# Patient Record
Sex: FEMALE | Race: WHITE | NOT HISPANIC OR LATINO | Employment: FULL TIME | URBAN - METROPOLITAN AREA
[De-identification: names, ages, dates, MRNs, and addresses within clinical notes are randomized per-mention and may not be internally consistent; named-entity substitution may affect disease eponyms.]

---

## 2022-07-01 ENCOUNTER — OFFICE VISIT (OUTPATIENT)
Dept: GASTROENTEROLOGY | Facility: CLINIC | Age: 67
End: 2022-07-01
Payer: COMMERCIAL

## 2022-07-01 VITALS
SYSTOLIC BLOOD PRESSURE: 118 MMHG | WEIGHT: 142 LBS | HEIGHT: 60 IN | BODY MASS INDEX: 27.88 KG/M2 | OXYGEN SATURATION: 98 % | DIASTOLIC BLOOD PRESSURE: 76 MMHG | HEART RATE: 78 BPM

## 2022-07-01 DIAGNOSIS — K21.9 GASTROESOPHAGEAL REFLUX DISEASE WITHOUT ESOPHAGITIS: ICD-10-CM

## 2022-07-01 DIAGNOSIS — K64.9 HEMORRHOIDS, UNSPECIFIED HEMORRHOID TYPE: ICD-10-CM

## 2022-07-01 DIAGNOSIS — K58.9 IRRITABLE BOWEL SYNDROME, UNSPECIFIED TYPE: Primary | ICD-10-CM

## 2022-07-01 PROCEDURE — 99203 OFFICE O/P NEW LOW 30 MIN: CPT | Performed by: PHYSICIAN ASSISTANT

## 2022-07-01 RX ORDER — FLUTICASONE PROPIONATE AND SALMETEROL 50; 250 UG/1; UG/1
POWDER RESPIRATORY (INHALATION)
COMMUNITY
Start: 2022-06-20

## 2022-07-01 RX ORDER — VALACYCLOVIR HYDROCHLORIDE 1 G/1
1000 TABLET, FILM COATED ORAL DAILY
COMMUNITY
Start: 2022-06-13

## 2022-07-01 RX ORDER — ALPRAZOLAM 0.5 MG/1
TABLET ORAL
COMMUNITY
Start: 2022-05-03

## 2022-07-01 RX ORDER — ALBUTEROL SULFATE 90 UG/1
2 AEROSOL, METERED RESPIRATORY (INHALATION) EVERY 6 HOURS PRN
COMMUNITY

## 2022-07-01 RX ORDER — METHYLPHENIDATE HYDROCHLORIDE 36 MG/1
36 TABLET ORAL DAILY
COMMUNITY
Start: 2022-05-03

## 2022-07-01 NOTE — PROGRESS NOTES
[facility] - Outpatient Consultation  Damian Mortimer 74 y o  female MRN: [id number]  Encounter: [id number]          ASSESSMENT AND PLAN:      1  Irritable bowel syndrome, unspecified type  2  Gastroesophageal reflux disease without esophagitis  3  Hemorrhoids, unspecified hemorrhoid type  -Will plan EGD and colonoscopy at this time   -Will continue over-the-counter supplementation   -Will continue probiotic therapy   ______________________________________________________________________    HPI:    73-year-old female who presents for GI consultation  Patient reports she is here to schedule EGD and colonoscopy  She reports her last colonoscopy was about 3 years ago and she did have colon polyps removed at that time  Patient does suffer from GERD, IBS, and hemorrhoids  She reports she does take a multitude of supplements and natural products to treat her GERD and IBS  She has been reporting worsening upper abdominal bloating and discomfort that started after an episode of vertigo  She does report that she takes Metamucil on a daily basis  She denies any alarm symptoms  She has not had a recent upper endoscopy  She does not take any over-the-counter antacids  She does not take any PPI therapy  REVIEW OF SYSTEMS:    CONSTITUTIONAL: Denies any fever, chills, rigors, and weight loss  HEENT: No earache or tinnitus  Denies hearing loss or visual disturbances  CARDIOVASCULAR: No chest pain or palpitations  RESPIRATORY: Denies any cough, hemoptysis, shortness of breath or dyspnea on exertion  GASTROINTESTINAL: As noted in the History of Present Illness  GENITOURINARY: No problems with urination  Denies any hematuria or dysuria  NEUROLOGIC: No dizziness or vertigo, denies headaches  MUSCULOSKELETAL: Denies any muscle or joint pain  SKIN: Denies skin rashes or itching  ENDOCRINE: Denies excessive thirst  Denies intolerance to heat or cold    PSYCHOSOCIAL: Denies depression or anxiety  Denies any recent memory loss  Historical Information   History reviewed  No pertinent past medical history  Past Surgical History:   Procedure Laterality Date    HYSTERECTOMY       Social History   Social History     Substance and Sexual Activity   Alcohol Use Yes    Comment: occ     Social History     Substance and Sexual Activity   Drug Use Never     Social History     Tobacco Use   Smoking Status Never Smoker   Smokeless Tobacco Never Used     Family History   Problem Relation Age of Onset    Addiction problem Mother     Heart disease Father        Meds/Allergies       Current Outpatient Medications:     Advair Diskus 250-50 MCG/ACT inhaler    albuterol (PROVENTIL HFA,VENTOLIN HFA) 90 mcg/act inhaler    ALPRAZolam (XANAX) 0 5 mg tablet    methylphenidate (CONCERTA) 36 MG ER tablet    valACYclovir (VALTREX) 1,000 mg tablet    No Known Allergies        Objective     Blood pressure 118/76, pulse 78, height 4' 11 5" (1 511 m), weight 64 4 kg (142 lb), SpO2 98 %  Body mass index is 28 2 kg/m²  PHYSICAL EXAM:      General Appearance:   Alert, cooperative, no distress   HEENT:   Normocephalic, atraumatic, anicteric      Neck:  Supple, symmetrical, trachea midline   Lungs:   Clear to auscultation bilaterally; no rales, rhonchi or wheezing; respirations unlabored    Heart[de-identified]   Regular rate and rhythm; no murmur, rub, or gallop  Abdomen:   Soft, non-tender, non-distended; normal bowel sounds; no masses, no organomegaly    Genitalia:   Deferred    Rectal:   Deferred    Extremities:  No cyanosis, clubbing or edema    Pulses:  2+ and symmetric    Skin:  No jaundice, rashes, or lesions    Lymph nodes:  No palpable cervical lymphadenopathy        Lab Results:   No visits with results within 1 Day(s) from this visit  Latest known visit with results is:   No results found for any previous visit  Radiology Results:   No results found

## 2022-07-01 NOTE — LETTER
July 1, 2022     MD [place] [address]    Patient: Jackson Rascon   YOB: 1955   Date of Visit: 7/1/2022       Dear Dr Checo Barraza: Thank you for referring [facility] to me for evaluation  Below are my notes for this consultation  If you have questions, please do not hesitate to call me  I look forward to following your patient along with you  Sincerely,        Katerin Polo PA-C        CC: No Recipients  Katerin Polo PA-C  7/1/2022 12:00 PM  Sign when Signing Visit  [facility] - Outpatient Consultation  Jackson Bowers 74 y o  female MRN: [id number]  Encounter: [id number]          ASSESSMENT AND PLAN:      1  Irritable bowel syndrome, unspecified type  2  Gastroesophageal reflux disease without esophagitis  3  Hemorrhoids, unspecified hemorrhoid type  -Will plan EGD and colonoscopy at this time   -Will continue over-the-counter supplementation   -Will continue probiotic therapy   ______________________________________________________________________    HPI:    73-year-old female who presents for GI consultation  Patient reports she is here to schedule EGD and colonoscopy  She reports her last colonoscopy was about 3 years ago and she did have colon polyps removed at that time  Patient does suffer from GERD, IBS, and hemorrhoids  She reports she does take a multitude of supplements and natural products to treat her GERD and IBS  She has been reporting worsening upper abdominal bloating and discomfort that started after an episode of vertigo  She does report that she takes Metamucil on a daily basis  She denies any alarm symptoms  She has not had a recent upper endoscopy  She does not take any over-the-counter antacids  She does not take any PPI therapy  REVIEW OF SYSTEMS:    CONSTITUTIONAL: Denies any fever, chills, rigors, and weight loss  HEENT: No earache or tinnitus   Denies hearing loss or visual disturbances  CARDIOVASCULAR: No chest pain or palpitations  RESPIRATORY: Denies any cough, hemoptysis, shortness of breath or dyspnea on exertion  GASTROINTESTINAL: As noted in the History of Present Illness  GENITOURINARY: No problems with urination  Denies any hematuria or dysuria  NEUROLOGIC: No dizziness or vertigo, denies headaches  MUSCULOSKELETAL: Denies any muscle or joint pain  SKIN: Denies skin rashes or itching  ENDOCRINE: Denies excessive thirst  Denies intolerance to heat or cold  PSYCHOSOCIAL: Denies depression or anxiety  Denies any recent memory loss  Historical Information   History reviewed  No pertinent past medical history  Past Surgical History:   Procedure Laterality Date    HYSTERECTOMY       Social History   Social History     Substance and Sexual Activity   Alcohol Use Yes    Comment: occ     Social History     Substance and Sexual Activity   Drug Use Never     Social History     Tobacco Use   Smoking Status Never Smoker   Smokeless Tobacco Never Used     Family History   Problem Relation Age of Onset    Addiction problem Mother     Heart disease Father        Meds/Allergies       Current Outpatient Medications:     Advair Diskus 250-50 MCG/ACT inhaler    albuterol (PROVENTIL HFA,VENTOLIN HFA) 90 mcg/act inhaler    ALPRAZolam (XANAX) 0 5 mg tablet    methylphenidate (CONCERTA) 36 MG ER tablet    valACYclovir (VALTREX) 1,000 mg tablet    No Known Allergies        Objective     Blood pressure 118/76, pulse 78, height 4' 11 5" (1 511 m), weight 64 4 kg (142 lb), SpO2 98 %  Body mass index is 28 2 kg/m²  PHYSICAL EXAM:      General Appearance:   Alert, cooperative, no distress   HEENT:   Normocephalic, atraumatic, anicteric      Neck:  Supple, symmetrical, trachea midline   Lungs:   Clear to auscultation bilaterally; no rales, rhonchi or wheezing; respirations unlabored    Heart[de-identified]   Regular rate and rhythm; no murmur, rub, or gallop     Abdomen:   Soft, non-tender, non-distended; normal bowel sounds; no masses, no organomegaly    Genitalia:   Deferred    Rectal:   Deferred    Extremities:  No cyanosis, clubbing or edema    Pulses:  2+ and symmetric    Skin:  No jaundice, rashes, or lesions    Lymph nodes:  No palpable cervical lymphadenopathy        Lab Results:   No visits with results within 1 Day(s) from this visit  Latest known visit with results is:   No results found for any previous visit  Radiology Results:   No results found

## 2022-07-01 NOTE — PATIENT INSTRUCTIONS
Scheduled date of EGD/colonoscopy (as of today):8/27/22  Physician performing EGD/colonoscopy:Meredith  Location of EGD/colonoscopy:[facility] bowel prep reviewed with patient:miralax/dulcolax  Instructions reviewed with patient by:Eboni HALL  Clearances:   none

## 2022-08-27 ENCOUNTER — ANESTHESIA (OUTPATIENT)
Dept: GASTROENTEROLOGY | Facility: HOSPITAL | Age: 67
End: 2022-08-27

## 2022-08-27 ENCOUNTER — ANESTHESIA EVENT (OUTPATIENT)
Dept: GASTROENTEROLOGY | Facility: HOSPITAL | Age: 67
End: 2022-08-27

## 2022-08-27 ENCOUNTER — HOSPITAL ENCOUNTER (OUTPATIENT)
Dept: GASTROENTEROLOGY | Facility: HOSPITAL | Age: 67
Setting detail: OUTPATIENT SURGERY
Discharge: HOME | End: 2022-08-27
Payer: COMMERCIAL

## 2022-08-27 VITALS
HEIGHT: 60 IN | TEMPERATURE: 99.1 F | RESPIRATION RATE: 19 BRPM | HEART RATE: 92 BPM | WEIGHT: 138.67 LBS | SYSTOLIC BLOOD PRESSURE: 130 MMHG | OXYGEN SATURATION: 98 % | BODY MASS INDEX: 27.22 KG/M2 | DIASTOLIC BLOOD PRESSURE: 68 MMHG

## 2022-08-27 DIAGNOSIS — K21.9 GASTROESOPHAGEAL REFLUX DISEASE WITHOUT ESOPHAGITIS: ICD-10-CM

## 2022-08-27 DIAGNOSIS — K58.9 IRRITABLE BOWEL SYNDROME, UNSPECIFIED TYPE: ICD-10-CM

## 2022-08-27 DIAGNOSIS — K64.9 HEMORRHOIDS, UNSPECIFIED HEMORRHOID TYPE: ICD-10-CM

## 2022-08-27 PROCEDURE — 88305 TISSUE EXAM BY PATHOLOGIST: CPT | Performed by: PATHOLOGY

## 2022-08-27 RX ORDER — PROPOFOL 10 MG/ML
INJECTION, EMULSION INTRAVENOUS AS NEEDED
Status: DISCONTINUED | OUTPATIENT
Start: 2022-08-27 | End: 2022-08-27

## 2022-08-27 RX ORDER — SODIUM CHLORIDE, SODIUM LACTATE, POTASSIUM CHLORIDE, CALCIUM CHLORIDE 600; 310; 30; 20 MG/100ML; MG/100ML; MG/100ML; MG/100ML
INJECTION, SOLUTION INTRAVENOUS CONTINUOUS PRN
Status: DISCONTINUED | OUTPATIENT
Start: 2022-08-27 | End: 2022-08-27

## 2022-08-27 RX ORDER — LIDOCAINE HYDROCHLORIDE 20 MG/ML
INJECTION, SOLUTION EPIDURAL; INFILTRATION; INTRACAUDAL; PERINEURAL AS NEEDED
Status: DISCONTINUED | OUTPATIENT
Start: 2022-08-27 | End: 2022-08-27

## 2022-08-27 RX ADMIN — PROPOFOL 50 MG: 10 INJECTION, EMULSION INTRAVENOUS at 10:49

## 2022-08-27 RX ADMIN — PROPOFOL 50 MG: 10 INJECTION, EMULSION INTRAVENOUS at 10:56

## 2022-08-27 RX ADMIN — PROPOFOL 50 MG: 10 INJECTION, EMULSION INTRAVENOUS at 10:51

## 2022-08-27 RX ADMIN — LIDOCAINE HYDROCHLORIDE 100 MG: 20 INJECTION, SOLUTION EPIDURAL; INFILTRATION; INTRACAUDAL; PERINEURAL at 10:48

## 2022-08-27 RX ADMIN — PROPOFOL 100 MG: 10 INJECTION, EMULSION INTRAVENOUS at 10:48

## 2022-08-27 RX ADMIN — PROPOFOL 50 MG: 10 INJECTION, EMULSION INTRAVENOUS at 11:00

## 2022-08-27 RX ADMIN — PROPOFOL 50 MG: 10 INJECTION, EMULSION INTRAVENOUS at 10:53

## 2022-08-27 RX ADMIN — SODIUM CHLORIDE, SODIUM LACTATE, POTASSIUM CHLORIDE, AND CALCIUM CHLORIDE: .6; .31; .03; .02 INJECTION, SOLUTION INTRAVENOUS at 10:24

## 2022-08-27 NOTE — ANESTHESIA POSTPROCEDURE EVALUATION
Post-Op Assessment Note    CV Status:  Stable  Pain Score: 0    Pain management: adequate     Mental Status:  Sleepy   Hydration Status:  Euvolemic   PONV Controlled:  Controlled   Airway Patency:  Patent      Post Op Vitals Reviewed: Yes      Staff: Anesthesiologist, CRNA         No complications documented      /82 (08/27/22 1103)    Temp      Pulse 96 (08/27/22 1103)   Resp 14 (08/27/22 1103)    SpO2 100 % (08/27/22 1103)

## 2022-08-27 NOTE — ANESTHESIA PREPROCEDURE EVALUATION
Procedure:  COLONOSCOPY    Relevant Problems   No relevant active problems   axniety, insomnia- chronic alprazlopam use (using less than last year 1 5 mg per week  0 5 qhs PRN)     Physical Exam    Airway    Mallampati score: II  TM Distance: >3 FB  Neck ROM: full     Dental       Cardiovascular  Cardiovascular exam normal    Pulmonary  Pulmonary exam normal     Other Findings        Anesthesia Plan  ASA Score- 2     Anesthesia Type- IV sedation with anesthesia with ASA Monitors  Additional Monitors:   Airway Plan:           Plan Factors-Exercise tolerance (METS): >4 METS  Chart reviewed  EKG reviewed  Imaging results reviewed  Existing labs reviewed  Patient summary reviewed  Patient is not a current smoker  Induction- intravenous  Postoperative Plan-     Informed Consent- Anesthetic plan and risks discussed with patient  I personally reviewed this patient with the CRNA  Discussed and agreed on the Anesthesia Plan with the CRNA  Esmer Horn

## 2022-08-27 NOTE — H&P
History and Physical -  [facility]  Josue Hardy 74 y o  female MRN: [id number]      HPI: Josue Hardy is a 74y o  year old female who presents for evaluation of irritable bowel syndrome, predominantly characterized by diarrhea and abdominal pain      REVIEW OF SYSTEMS: Per the HPI, and otherwise unremarkable  Historical Information   Past Medical History:   Diagnosis Date    Asthma     Colon polyp      Past Surgical History:   Procedure Laterality Date    HYSTERECTOMY       Social History   Social History     Substance and Sexual Activity   Alcohol Use Yes    Comment: occ     Social History     Substance and Sexual Activity   Drug Use Never     Social History     Tobacco Use   Smoking Status Never Smoker   Smokeless Tobacco Never Used     Family History   Problem Relation Age of Onset    Addiction problem Mother     Heart disease Father        Meds/Allergies     (Not in a hospital admission)      No Known Allergies    Objective     Blood pressure 130/71, pulse 98, temperature 98 °F (36 7 °C), temperature source Temporal, resp  rate 16, height 5' (1 524 m), weight 62 9 kg (138 lb 10 7 oz), SpO2 96 %  PHYSICAL EXAM    Gen: NAD  CV: RRR  CHEST: Clear  ABD: soft, NT/ND  EXT: no edema      ASSESSMENT/PLAN:  This is a 74y o  year old female here for colonoscopy with biopsy, and she is stable and optimized for her procedure

## 2022-08-29 ENCOUNTER — NURSE TRIAGE (OUTPATIENT)
Dept: OTHER | Facility: OTHER | Age: 67
End: 2022-08-29

## 2022-08-29 NOTE — TELEPHONE ENCOUNTER
Patient is having abdominal pain and usually takes metamucil but wants to confirm with the provider first  Reported she recently had a biopsy  Notes she had a fever yesterday, but denies fever today  Requesting a call back

## 2022-08-29 NOTE — TELEPHONE ENCOUNTER
Regarding: Post procedure symptoms  ----- Message from Reina Medina sent at 8/29/2022  3:22 PM EDT -----  " I am a little concern  My abdomen is very sore today  I did have a fever yesterday of 101 5 taken around 2pm  I did take tylenol for fever    No fever today but abdomen still very sore "

## 2022-08-29 NOTE — TELEPHONE ENCOUNTER
Reason for Disposition   MODERATE pain (e g , interferes with normal activities that comes and goes (cramps) lasts > 24 hours (Exception: pain with Vomiting or Diarrhea - see that Protocol)    Answer Assessment - Initial Assessment Questions  1  LOCATION: "Where does it hurt?"   Lower abdomen  2  RADIATION: "Does the pain shoot anywhere else?" (e g , chest, back)  Denies  3  ONSET: "When did the pain begin?" (e g , minutes, hours or days ago)   Saturday   4  SUDDEN: "Gradual or sudden onset?"  Gradual  5  PATTERN "Does the pain come and go, or is it constant?"     - If constant: "Is it getting better, staying the same, or worsening?"       (Note: Constant means the pain never goes away completely; most serious pain is constant and it progresses)      - If intermittent: "How long does it last?" "Do you have pain now?"      (Note: Intermittent means the pain goes away completely between bouts)  Constant   6  SEVERITY: "How bad is the pain?"  (e g , Scale 1-10; mild, moderate, or severe)    - MILD (1-3): doesn't interfere with normal activities, abdomen soft and not tender to touch     - MODERATE (4-7): interferes with normal activities or awakens from sleep, tender to touch     - SEVERE (8-10): excruciating pain, doubled over, unable to do any normal activities   Sore-  4/10  7  RECURRENT SYMPTOM: "Have you ever had this type of stomach pain before?" If Yes, ask: "When was the last time?" and "What happened that time?"   Denies  8  CAUSE: "What do you think is causing the stomach pain?"   I recently had a biopsy   9  RELIEVING/AGGRAVATING FACTORS: "What makes it better or worse?" (e g , movement, antacids, bowel movement)  Worse with bowel movement, or coughing   10  OTHER SYMPTOMS: "Has there been any vomiting, diarrhea, constipation, or urine problems?"   Denies    Fever 101 5F yesterday  Denies fever today      Protocols used: ABDOMINAL PAIN - FEMALE-ADULT-[place], ABDOMINAL PAIN - [facility]

## 2022-09-01 ENCOUNTER — TELEPHONE (OUTPATIENT)
Dept: GASTROENTEROLOGY | Facility: CLINIC | Age: 67
End: 2022-09-01

## 2022-09-01 PROCEDURE — 88305 TISSUE EXAM BY PATHOLOGIST: CPT | Performed by: PATHOLOGY

## 2022-09-01 NOTE — TELEPHONE ENCOUNTER
----- Message from Maite Laughlin DO sent at 9/1/2022  2:25 PM EDT -----  please call the patient with the biopsy results  Biopsies of the random colon were benign, negative for microscopic colitis and negative for cancer

## 2022-09-16 ENCOUNTER — TELEPHONE (OUTPATIENT)
Dept: GASTROENTEROLOGY | Facility: CLINIC | Age: 67
End: 2022-09-16

## 2022-09-16 NOTE — TELEPHONE ENCOUNTER
As per patients request the upper endoscopy scheduled on 8/27/22 was cancelled and not performed  Only the colonoscopy was performed

## 2022-09-23 ENCOUNTER — DOCUMENTATION (OUTPATIENT)
Dept: GASTROENTEROLOGY | Facility: CLINIC | Age: 67
End: 2022-09-23

## 2022-09-23 NOTE — PROGRESS NOTES
The EGD that was scheduled for 8/27/22 was not performed  The patient requested on 8/27/22 that this EGD not be performed  Only the colonoscopy was performed

## 2022-11-03 ENCOUNTER — TELEPHONE (OUTPATIENT)
Dept: GASTROENTEROLOGY | Facility: CLINIC | Age: 67
End: 2022-11-03

## 2022-11-03 NOTE — TELEPHONE ENCOUNTER
Patients GI provider:  Dr Josse To    Number to return call: [phone]     Reason for call: Pt called and stated she received a $600 bill from [employer] and per patients insurance Colonoscopy was coded incorrectly  She contacted [employer] but they stated we have to review and code it  correctly   please review and reach out to patient to discuss  thank you      Scheduled procedure/appointment date if applicable: n/a

## 2022-11-04 NOTE — TELEPHONE ENCOUNTER
Spoke with the patient and advised that given her history of colon polyps based upon the information she provided to Arthur Arias during the visit, this was not just a screening colonoscopy  I could not adjust the code based upon the discussion with Arthur Arias as that would be insurance fraud  She expressed her frustration very loudly and used profanity during our conversation  She stated she will never come back to [facility] and that she has never paid for a colonoscopy before in her life  She was transferred to the billing department to set up a payment arrangement  Spoke with Favio Adams in billing and did informed her of the situation prior to making the transfer

## 2025-03-12 ENCOUNTER — OFFICE VISIT (OUTPATIENT)
Dept: CARDIOLOGY CLINIC | Facility: CLINIC | Age: 70
End: 2025-03-12
Payer: COMMERCIAL

## 2025-03-12 VITALS
WEIGHT: 130.6 LBS | HEART RATE: 86 BPM | BODY MASS INDEX: 25.64 KG/M2 | HEIGHT: 60 IN | DIASTOLIC BLOOD PRESSURE: 70 MMHG | SYSTOLIC BLOOD PRESSURE: 110 MMHG

## 2025-03-12 DIAGNOSIS — E78.5 DYSLIPIDEMIA: ICD-10-CM

## 2025-03-12 DIAGNOSIS — R07.2 PRECORDIAL PAIN: ICD-10-CM

## 2025-03-12 DIAGNOSIS — Z82.49 FAMILY HISTORY OF HEART ATTACK: Primary | ICD-10-CM

## 2025-03-12 PROCEDURE — 99204 OFFICE O/P NEW MOD 45 MIN: CPT | Performed by: INTERNAL MEDICINE

## 2025-03-12 PROCEDURE — 93000 ELECTROCARDIOGRAM COMPLETE: CPT | Performed by: INTERNAL MEDICINE

## 2025-03-12 RX ORDER — BUDESONIDE 0.5 MG/2ML
0.5 INHALANT ORAL 2 TIMES DAILY
COMMUNITY
Start: 2025-03-04 | End: 2026-03-04

## 2025-03-12 RX ORDER — SODIUM CHLORIDE FOR INHALATION 0.9 %
VIAL, NEBULIZER (ML) INHALATION
COMMUNITY
Start: 2025-03-06

## 2025-03-12 RX ORDER — PIRFENIDONE 267 MG/1
1 TABLET, FILM COATED ORAL 3 TIMES DAILY
COMMUNITY
Start: 2025-01-14

## 2025-03-12 NOTE — PROGRESS NOTES
Patient ID: Katiuska Salinas is a 70 y.o. female.        Plan:      Assessment & Plan  Precordial pain  Not clearly angina but given strong family history of early CAD, need to r/o overt disease.  Stress echo will be ordered.  As well CT coronary calcium score will be obtained.  Dyslipidemia  We talked at length about whether to take statin tx and agreed to hold off until after testing.  Family history of heart attack  Testing as above      Follow up Plan/Other summary comments:  We will regroup  after the above.  She eats lots of rich.  It may be worth retesting labs off rich and butter if she resists statin tx.    HPI: Patient is seen today to establish care.  She is concerned for the reasons above.  There is occasional precordial discomfort that radiated to the neck which he blames on esophageal spasm.  This does not occur with effort.  There is an issue with dyspnea which dates back to aspiration postpartum many years ago and a long hospital stay.  She has what sounds like some residual pulmonary fibrosis from this.  Multiple family members have had heart disease at a young age.  No recent change in exertional capacity.  In fact she would like to exert herself more in the future.      Results for orders placed or performed in visit on 03/12/25   POCT ECG    Impression    Sinus rhythm.  Nonspecific ST segment changes.         Most recent or relevant cardiac/vascular testing:    High-resolution chest CT 12/6/2024: Mild coronary calcification.  LDL cholesterol on 5/25/2024: 161.      Past Surgical History:   Procedure Laterality Date    HYSTERECTOMY         Lipid Profile: Reviewed      Review of Systems   10  point ROS  was otherwise non pertinent or negative except as per HPI or as below.   Gait: Normal.        Objective:     /70   Pulse 86   Ht 5' (1.524 m)   Wt 59.2 kg (130 lb 9.6 oz)   BMI 25.51 kg/m²     PHYSICAL EXAM:    General:  Normal appearance in no distress.  Eyes:  Anicteric.  Oral mucosa:   Moist.  Neck:  No JVD. Carotid upstrokes are brisk without bruits.  No masses.  Chest:  Clear to auscultation.  Cardiac:  No palpable PMI.  Normal S1 and S2.  No murmur gallop or rub.  Abdomen:  Soft and nontender. No palpable organomegaly or aortic enlargement.  Extremities:  No peripheral edema.  Musculoskeletal:  Symmetric.   Vascular:  Femoral pulses are brisk without bruits.  Popliteal pulses are intact bilaterally.   Pedal pulses are intact.  Neuro:  Grossly symmetric.  Psych:  Alert and oriented x3.      Meds reviewed.    Past Medical History:   Diagnosis Date    Asthma     Colon polyp     Family history of heart attack     father    GERD (gastroesophageal reflux disease)     Irritable bowel syndrome     Pulmonary fibrosis (HCC)            Social History     Tobacco Use   Smoking Status Never   Smokeless Tobacco Never

## 2025-03-12 NOTE — ASSESSMENT & PLAN NOTE
Not clearly angina but given strong family history of early CAD, need to r/o overt disease.  Stress echo will be ordered.  As well CT coronary calcium score will be obtained.

## 2025-04-11 ENCOUNTER — HOSPITAL ENCOUNTER (OUTPATIENT)
Dept: NON INVASIVE DIAGNOSTICS | Facility: CLINIC | Age: 70
Discharge: HOME | End: 2025-04-11